# Patient Record
Sex: MALE | Race: WHITE | NOT HISPANIC OR LATINO | URBAN - METROPOLITAN AREA
[De-identification: names, ages, dates, MRNs, and addresses within clinical notes are randomized per-mention and may not be internally consistent; named-entity substitution may affect disease eponyms.]

---

## 2022-07-03 ENCOUNTER — HOSPITAL ENCOUNTER (EMERGENCY)
Facility: HOSPITAL | Age: 62
Discharge: HOME | End: 2022-07-03
Attending: EMERGENCY MEDICINE
Payer: COMMERCIAL

## 2022-07-03 VITALS
HEIGHT: 74 IN | TEMPERATURE: 99 F | RESPIRATION RATE: 18 BRPM | OXYGEN SATURATION: 100 % | SYSTOLIC BLOOD PRESSURE: 135 MMHG | BODY MASS INDEX: 26.88 KG/M2 | HEART RATE: 60 BPM | DIASTOLIC BLOOD PRESSURE: 88 MMHG | WEIGHT: 209.44 LBS

## 2022-07-03 DIAGNOSIS — R42 DIZZINESS: ICD-10-CM

## 2022-07-03 DIAGNOSIS — H53.9 VISUAL CHANGES: Primary | ICD-10-CM

## 2022-07-03 LAB
ALBUMIN SERPL-MCNC: 4.1 GM/DL (ref 3.4–4.8)
ALBUMIN/GLOB SERPL: 1.6 RATIO (ref 1.1–2)
ALP SERPL-CCNC: 54 UNIT/L (ref 40–150)
ALT SERPL-CCNC: 25 UNIT/L (ref 0–55)
APPEARANCE UR: CLEAR
AST SERPL-CCNC: 19 UNIT/L (ref 5–34)
BACTERIA #/AREA URNS AUTO: NORMAL /HPF
BASOPHILS # BLD AUTO: 0.03 X10(3)/MCL (ref 0–0.2)
BASOPHILS NFR BLD AUTO: 0.6 %
BILIRUB UR QL STRIP.AUTO: NEGATIVE MG/DL
BILIRUBIN DIRECT+TOT PNL SERPL-MCNC: 0.8 MG/DL
BUN SERPL-MCNC: 16.7 MG/DL (ref 8.4–25.7)
CALCIUM SERPL-MCNC: 9.9 MG/DL (ref 8.8–10)
CHLORIDE SERPL-SCNC: 107 MMOL/L (ref 98–107)
CO2 SERPL-SCNC: 29 MMOL/L (ref 23–31)
COLOR UR AUTO: YELLOW
CREAT SERPL-MCNC: 0.88 MG/DL (ref 0.73–1.18)
EOSINOPHIL # BLD AUTO: 0.06 X10(3)/MCL (ref 0–0.9)
EOSINOPHIL NFR BLD AUTO: 1.2 %
ERYTHROCYTE [DISTWIDTH] IN BLOOD BY AUTOMATED COUNT: 12.9 % (ref 11.5–17)
GLOBULIN SER-MCNC: 2.5 GM/DL (ref 2.4–3.5)
GLUCOSE SERPL-MCNC: 102 MG/DL (ref 82–115)
GLUCOSE UR QL STRIP.AUTO: NEGATIVE MG/DL
HCT VFR BLD AUTO: 46.9 % (ref 42–52)
HGB BLD-MCNC: 15.2 GM/DL (ref 14–18)
IMM GRANULOCYTES # BLD AUTO: 0.02 X10(3)/MCL (ref 0–0.04)
IMM GRANULOCYTES NFR BLD AUTO: 0.4 %
KETONES UR QL STRIP.AUTO: NEGATIVE MG/DL
LEUKOCYTE ESTERASE UR QL STRIP.AUTO: NEGATIVE UNIT/L
LYMPHOCYTES # BLD AUTO: 1.23 X10(3)/MCL (ref 0.6–4.6)
LYMPHOCYTES NFR BLD AUTO: 24.7 %
MAGNESIUM SERPL-MCNC: 2.2 MG/DL (ref 1.6–2.6)
MCH RBC QN AUTO: 29.7 PG (ref 27–31)
MCHC RBC AUTO-ENTMCNC: 32.4 MG/DL (ref 33–36)
MCV RBC AUTO: 91.6 FL (ref 80–94)
MONOCYTES # BLD AUTO: 0.37 X10(3)/MCL (ref 0.1–1.3)
MONOCYTES NFR BLD AUTO: 7.4 %
NEUTROPHILS # BLD AUTO: 3.3 X10(3)/MCL (ref 2.1–9.2)
NEUTROPHILS NFR BLD AUTO: 65.7 %
NITRITE UR QL STRIP.AUTO: NEGATIVE
NRBC BLD AUTO-RTO: 0 %
PH UR STRIP.AUTO: 7 [PH]
PLATELET # BLD AUTO: 210 X10(3)/MCL (ref 130–400)
PMV BLD AUTO: 10.6 FL (ref 7.4–10.4)
POTASSIUM SERPL-SCNC: 4.1 MMOL/L (ref 3.5–5.1)
PROT SERPL-MCNC: 6.6 GM/DL (ref 5.8–7.6)
PROT UR QL STRIP.AUTO: NEGATIVE MG/DL
RBC # BLD AUTO: 5.12 X10(6)/MCL (ref 4.7–6.1)
RBC #/AREA URNS AUTO: <5 /HPF
RBC UR QL AUTO: NEGATIVE UNIT/L
SODIUM SERPL-SCNC: 145 MMOL/L (ref 136–145)
SP GR UR STRIP.AUTO: 1.02 (ref 1–1.03)
SQUAMOUS #/AREA URNS AUTO: <5 /HPF
T4 FREE SERPL-MCNC: 0.99 NG/DL (ref 0.7–1.48)
TROPONIN I SERPL-MCNC: <0.01 NG/ML (ref 0–0.04)
TSH SERPL-ACNC: 0.64 UIU/ML (ref 0.35–4.94)
UROBILINOGEN UR STRIP-ACNC: 0.2 MG/DL
WBC # SPEC AUTO: 5 X10(3)/MCL (ref 4.5–11.5)
WBC #/AREA URNS AUTO: <5 /HPF

## 2022-07-03 PROCEDURE — 84484 ASSAY OF TROPONIN QUANT: CPT | Performed by: PHYSICIAN ASSISTANT

## 2022-07-03 PROCEDURE — 84439 ASSAY OF FREE THYROXINE: CPT | Performed by: PHYSICIAN ASSISTANT

## 2022-07-03 PROCEDURE — 83735 ASSAY OF MAGNESIUM: CPT | Performed by: PHYSICIAN ASSISTANT

## 2022-07-03 PROCEDURE — 85025 COMPLETE CBC W/AUTO DIFF WBC: CPT | Performed by: PHYSICIAN ASSISTANT

## 2022-07-03 PROCEDURE — 99285 EMERGENCY DEPT VISIT HI MDM: CPT | Mod: 25

## 2022-07-03 PROCEDURE — 36415 COLL VENOUS BLD VENIPUNCTURE: CPT | Performed by: PHYSICIAN ASSISTANT

## 2022-07-03 PROCEDURE — 84443 ASSAY THYROID STIM HORMONE: CPT | Performed by: PHYSICIAN ASSISTANT

## 2022-07-03 PROCEDURE — 63600175 PHARM REV CODE 636 W HCPCS: Performed by: EMERGENCY MEDICINE

## 2022-07-03 PROCEDURE — 80053 COMPREHEN METABOLIC PANEL: CPT | Performed by: PHYSICIAN ASSISTANT

## 2022-07-03 PROCEDURE — 93010 ELECTROCARDIOGRAM REPORT: CPT | Mod: ,,, | Performed by: INTERNAL MEDICINE

## 2022-07-03 PROCEDURE — 96360 HYDRATION IV INFUSION INIT: CPT

## 2022-07-03 PROCEDURE — 81001 URINALYSIS AUTO W/SCOPE: CPT | Performed by: PHYSICIAN ASSISTANT

## 2022-07-03 PROCEDURE — 93005 ELECTROCARDIOGRAM TRACING: CPT

## 2022-07-03 PROCEDURE — 93010 EKG 12-LEAD: ICD-10-PCS | Mod: ,,, | Performed by: INTERNAL MEDICINE

## 2022-07-03 RX ORDER — OLMESARTAN MEDOXOMIL AND HYDROCHLOROTHIAZIDE 40/12.5 40; 12.5 MG/1; MG/1
1 TABLET ORAL DAILY
COMMUNITY
Start: 2022-04-25

## 2022-07-03 RX ADMIN — SODIUM CHLORIDE, POTASSIUM CHLORIDE, SODIUM LACTATE AND CALCIUM CHLORIDE 1000 ML: 600; 310; 30; 20 INJECTION, SOLUTION INTRAVENOUS at 11:07

## 2022-07-03 NOTE — FIRST PROVIDER EVALUATION
"Medical screening exam completed.  I have conducted a focused provider triage encounter, findings are as follows:    Brief history of present illness:  62 year old male presents to ED for dizziness and blurry vision x 2 days with numbness/tingling to bilateral upper extremities with speech changes with nasal voice; patient reports "fogginess" x 1 week; pt reports he was seen by PCP 2 days ago and told he had neck tension-given muscle relaxer and referred to PT    Vitals:    07/03/22 0930   BP: (!) 133/90   Pulse: 74   Resp: 20   Temp: 98.6 °F (37 °C)   TempSrc: Oral   SpO2: 98%   Weight: 95 kg (209 lb 7 oz)   Height: 6' 1.62" (1.87 m)       Pertinent physical exam:  Awake, alert     Brief workup plan:  CBC, CMP, UA, Troponin, EKG, Chest x-ray, CT head     Preliminary workup initiated; this workup will be continued and followed by the physician or advanced practice provider that is assigned to the patient when roomed.  "

## 2022-07-03 NOTE — ED PROVIDER NOTES
"Encounter Date: 7/3/2022       History     Chief Complaint   Patient presents with    Dizziness     Fogginess x 1 week, blurry vision x 2 days, tingling bilaterally thumb and finger, speech issue with nasal voice. Feels like something is up in the back of the mouth.  dr mera on Friday, using nasal spray thought was the cause with neck tension- ordered PT, and muscle relaxer. Recently treated for left ear infection which he states resolved. Denies fever,trauma,n/v.     62-year-old male with history of hypertension presents for evaluation of multiple complaints, mainly a "fogginess" for the past week.  He also describes a tingling sensation in his index finger and thumbs bilaterally, and neck tension.  This last week was his last week of work as he is now retired, although he did not do anything strenuous.  He does not recall any traumatic injury to his neck.  He was seen by his primary care physician 2 days ago for these symptoms and prescribed Flexeril and Mobic for his neck tension.  He states these medications have not really helped however his wife has been massaging his neck and he has been using heat therapy with some relief.  Since starting these medications he also reports blurred vision or double vision and changes in his speech that he describes as "nasal speech."  He feels as though something is holding down the back of his palate.  Speech changes are only present when he is sitting upright.  Lastly, he feels "unsteady" on his feet when walking.  He is able to walk and was able to do yard work yesterday.  When I asked him to elaborate about the unsteadiness, he refers back to the "fogginess." No focal weakness reported, no headache or fever.  He denies dizziness.        Review of patient's allergies indicates:  No Known Allergies  Past Medical History:   Diagnosis Date    Essential (primary) hypertension      No past surgical history on file.  No family history on file.  Social History     Tobacco Use " "   Smoking status: Never Smoker   Substance Use Topics    Alcohol use: Not Currently    Drug use: Never     Review of Systems   Constitutional: Negative for appetite change, fever and unexpected weight change.   HENT: Positive for postnasal drip, sinus pressure and voice change. Negative for rhinorrhea, sinus pain, sore throat, tinnitus and trouble swallowing.    Eyes: Positive for visual disturbance (blurred vision and double vision both described, reportedly binocular ).   Respiratory: Negative for cough and shortness of breath.    Cardiovascular: Negative for chest pain.   Gastrointestinal: Negative for abdominal pain, constipation, diarrhea, nausea and vomiting.   Genitourinary: Negative for dysuria.   Musculoskeletal: Negative for back pain and neck pain.   Skin: Negative for rash.   Neurological: Positive for numbness (index and thumb fingers bilaterally). Negative for dizziness, syncope, weakness, light-headedness and headaches.        Unsteady gait, "fogginess"       Physical Exam     Initial Vitals [07/03/22 0930]   BP Pulse Resp Temp SpO2   (!) 133/90 74 20 98.6 °F (37 °C) 98 %      MAP       --         Physical Exam    Nursing note and vitals reviewed.  Constitutional: He appears well-developed and well-nourished. He is not diaphoretic. No distress.   HENT:   Head: Normocephalic and atraumatic.   Mouth/Throat: Oropharynx is clear and moist.   TMs clear bilaterally, EACs clear bilaterally, OP/NP clear, no TTP over the sinuses   Eyes: Conjunctivae and EOM are normal. Pupils are equal, round, and reactive to light.   No nystagmus   Neck: Neck supple. No tracheal deviation present.   Normal range of motion.  Cardiovascular: Normal rate, regular rhythm and intact distal pulses.   Pulmonary/Chest: Breath sounds normal. No respiratory distress. He has no wheezes. He has no rhonchi. He has no rales.   Abdominal: Abdomen is soft. Bowel sounds are normal. He exhibits no distension. There is no abdominal " tenderness.   Musculoskeletal:         General: No edema. Normal range of motion.      Cervical back: Normal range of motion and neck supple.     Neurological: He is alert and oriented to person, place, and time. He has normal strength. No cranial nerve deficit or sensory deficit (sensation intact to light touch specifically to the hands bilaterally). GCS score is 15. GCS eye subscore is 4. GCS verbal subscore is 5. GCS motor subscore is 6.   Skin: Skin is warm and dry. Capillary refill takes less than 2 seconds. No pallor.   Psychiatric: He has a normal mood and affect.   anxious         ED Course   Procedures  Labs Reviewed   CBC WITH DIFFERENTIAL - Abnormal; Notable for the following components:       Result Value    MCHC 32.4 (*)     MPV 10.6 (*)     All other components within normal limits   URINALYSIS, REFLEX TO URINE CULTURE - Normal   TROPONIN I - Normal   T4, FREE - Normal   TSH - Normal   MAGNESIUM - Normal   URINALYSIS, MICROSCOPIC - Normal   COMPREHENSIVE METABOLIC PANEL   CBC W/ AUTO DIFFERENTIAL    Narrative:     The following orders were created for panel order CBC Auto Differential.  Procedure                               Abnormality         Status                     ---------                               -----------         ------                     CBC with Differential[[id number]]        Abnormal            Final result                 Please view results for these tests on the individual orders.     EKG Readings: (Independently Interpreted)   Initial Reading: No STEMI. Rhythm: Normal Sinus Rhythm. Heart Rate: 61. Ectopy: No Ectopy. ST Segments: Normal ST Segments. T Waves: Normal.   1104     ECG Results          EKG 12-lead (Final result)  Result time 07/03/22 17:03:29    Final result by Interface, Lab In [facility] (07/03/22 17:03:29)                 Narrative:    Test Reason : R42,    Vent. Rate : 061 BPM     Atrial Rate : 061 BPM     P-R Int : 152 ms          QRS Dur : 082 ms      QT Int :  380 ms       P-R-T Axes : 050 002 065 degrees     QTc Int : 382 ms    Normal sinus rhythm  Normal ECG  No previous ECGs available  Confirmed by Monroe Moran MD ([id number]) on 7/3/2022 5:03:21 PM    Referred By: LESA   SELF           Confirmed By:Monroe Moran MD                            Imaging Results          MRI Brain Limited Without Contrast (Final result)  Result time 07/03/22 14:39:51    Final result by Alona Samuel MD (07/03/22 14:39:51)                 Impression:      No areas of diffusion restriction to suggest an acute infarct.      Electronically signed by: Alona Samuel  Date:    07/03/2022  Time:    14:39             Narrative:    EXAMINATION:  MRI BRAIN LIMITED WITHOUT CONTRAST    CLINICAL HISTORY:  dysequilibrium;;    TECHNIQUE:  Limited multisequence MR images of the brain were obtained WITHOUT the administration of intravenous contrast.    Axial DWI    Axial T2 FLAIR    COMPARISON:  Head CT earlier the same day                               X-Ray Chest 1 View (Final result)  Result time 07/03/22 10:43:59    Final result by Kati Rowland MD (07/03/22 10:43:59)                 Impression:      No acute cardiopulmonary abnormality.      Electronically signed by: Kati Rowland  Date:    07/03/2022  Time:    10:43             Narrative:    EXAMINATION:  XR CHEST 1 VIEW    CLINICAL HISTORY:  Dizziness and giddiness    TECHNIQUE:  Single frontal view of the chest was performed.    COMPARISON:  08/05/2021    FINDINGS:  LINES AND TUBES: EKG/telemetry leads overlie the chest.    MEDIASTINUM AND EMILI: The cardiac silhouette is normal.    LUNGS: No lobar consolidation. No edema.    PLEURA:No pleural effusion. No pneumothorax.    BONES: No acute osseous abnormality.  Partially imaged left shoulder hardware.                               CT Head Without Contrast (Final result)  Result time 07/03/22 10:27:59    Final result by Alona Samuel MD (07/03/22 10:27:59)                 Impression:  "     No acute intracranial abnormalities.      Electronically signed by: Alona Samuel  Date:    07/03/2022  Time:    10:27             Narrative:    EXAMINATION:  CT HEAD WITHOUT CONTRAST    CLINICAL HISTORY:  dizziness, blurry vision;    TECHNIQUE:  Axial scans were obtained from skull base to the vertex.    Coronal and sagittal reconstructions obtained from the axial data.    Automatic exposure control was utilized to limit radiation dose.    Contrast: None    Radiation Dose:    Total DLP: 1038 mGy*cm    COMPARISON:  None    FINDINGS:  Scalp/Skull:    No abnormalities.    Brain sulci: Appropriate for patient's age.    Ventricles: Normal in size and configuration. No hydrocephalus.    Extra-axial spaces:    Millimetric interhemispheric lipomas.    Otherwise no masses or fluid collections.    Parenchyma:    Minimal chronic microangiopathy in the supratentorial white matter.    No mass, hemorrhage or CT evidence of an acute vascular insult.    Dural sinuses: No abnormal densities.    Sellar/Suprasellar region: No abnormalities.    Skull base and Craniocervical junction: No abnormalities.    Incidental findings:    Carotid siphon atherosclerotic vascular calcifications.                              X-Rays:   Independently Interpreted Readings:   Chest X-Ray: Normal heart size.  No infiltrates.  No acute abnormalities.   Head CT: No hemorrhage.     Medications   lactated ringers bolus 1,000 mL (0 mLs Intravenous Stopped 7/3/22 1241)     Medical Decision Making:   Initial Assessment:   61 yo M with vague complaints of "fogginess" over the past week, visual and speech changes for the past 2 days - possibly related to Flexeril use? Vertigo? He describes feeling unsteady while walking but is able to walk, no dizziness. He has had vertiginous symptoms in the past and states this is not similar. Screening work-up with EKG, labs, and CT head obtained. EKG and imaging unremarkable. Awaiting labs. I will start IVF, MRI " "considered if symptoms don't improve with conservative measures considering possibility of cerebellar and CN involvement.   Independently Interpreted Test(s):   I have ordered and independently interpreted X-rays - see prior notes.  I have ordered and independently interpreted EKG Reading(s) - see prior notes  Clinical Tests:   Lab Tests: Ordered  Radiological Study: Ordered and Reviewed  Medical Tests: Ordered and Reviewed             ED Course as of 07/05/22 1318   Sun Jul 03, 2022   1159 Patient and his wife still reporting speech changes. No jerome dysarthria or aphasia noted on my exam. He is also still feeling "foggy" and specifically reporting double vision now. Labs unremarkable, he has not received much fluid as of now. MRI ordered.  [RB]   1418 Patient noted to be ambulatory without issue to MRI, conversing with MRI technician. She did not report any speech difficulties when asked. Nursing staff also has not noted any speech difficulties.  [RB]   1521 MRI negative for acute findings, results discussed with the patient. He is still reporting no change in symptoms but again is ambulatory with a steady gait, no ataxia, CN findings, or focal weakness. As I was not appreciating speech changes, I asked him to demonstrate for me. He sat up in the ED bed and his speech did sound intermittently more nasal in quality. I still did not appreciate any dysarthria. I offered reassurance as his brain imaging and labs do not reveal anything concerning today. He is advised to stop Flexeril and follow-up with his PCP regarding continued evaluation and management. He is no longer having neck pain/tension and is not requesting anything else for pain. Heat therapy discussed. I also discussed the potential for increased anxiety due to recent detention. Regarding his diplopia, at the final exam, he thought it may be monocular in the left eye. He is advised to follow-up with ophthalmology. Return precautions discussed and he is " discharged in stable condition.  [RB]      ED Course User Index  [RB] Adina Brenner MD             Clinical Impression:   Final diagnoses:  [R42] Dizziness  [H53.9] Visual changes (Primary)          ED Disposition Condition    Discharge Stable        ED Prescriptions     None        Follow-up Information     Follow up With Specialties Details Why Contact Info    Manuel Pedro MD Internal Medicine  call for appointment on Tuesday [address]  [facility] [id number] [phone]      [facility] - Emergency Dept Emergency Medicine  As needed, If symptoms worsen [address] [place] [id number] [phone]    Kashif Sanon MD Ophthalmology Call in 2 days for follow-up appointment this week [address]  [address]  [facility] [id number] [phone]             Adina Brenner MD  07/05/22 6757

## 2022-12-13 ENCOUNTER — PATIENT MESSAGE (OUTPATIENT)
Dept: RESEARCH | Facility: HOSPITAL | Age: 62
End: 2022-12-13
Payer: COMMERCIAL

## 2023-09-07 ENCOUNTER — PATIENT MESSAGE (OUTPATIENT)
Dept: RESEARCH | Facility: HOSPITAL | Age: 63
End: 2023-09-07
Payer: COMMERCIAL